# Patient Record
Sex: MALE | Race: WHITE | URBAN - METROPOLITAN AREA
[De-identification: names, ages, dates, MRNs, and addresses within clinical notes are randomized per-mention and may not be internally consistent; named-entity substitution may affect disease eponyms.]

---

## 2022-09-02 PROBLEM — Z30.2 ENCOUNTER FOR STERILIZATION: Status: ACTIVE | Noted: 2022-09-02

## 2024-05-21 LAB
BUN / CREAT RATIO: NORMAL
BUN BLDV-MCNC: 11 MG/DL
CREAT SERPL-MCNC: 0.95 MG/DL

## 2024-07-08 ENCOUNTER — OFFICE VISIT (OUTPATIENT)
Age: 34
End: 2024-07-08
Payer: COMMERCIAL

## 2024-07-08 VITALS — WEIGHT: 220 LBS | HEIGHT: 68 IN | BODY MASS INDEX: 33.34 KG/M2

## 2024-07-08 DIAGNOSIS — Z98.52 S/P VASECTOMY: ICD-10-CM

## 2024-07-08 DIAGNOSIS — R35.0 URINARY FREQUENCY: Primary | ICD-10-CM

## 2024-07-08 PROBLEM — Z30.2 ENCOUNTER FOR STERILIZATION: Status: RESOLVED | Noted: 2022-09-02 | Resolved: 2024-07-08

## 2024-07-08 LAB
BILIRUBIN, POC: NORMAL
BLOOD URINE, POC: NORMAL
CLARITY, POC: CLEAR
COLOR, POC: YELLOW
GLUCOSE URINE, POC: NORMAL
KETONES, POC: NORMAL
LEUKOCYTE EST, POC: NORMAL
NITRITE, POC: NORMAL
PH, POC: NORMAL
PROTEIN, POC: NORMAL
SPECIFIC GRAVITY, POC: NORMAL
UROBILINOGEN, POC: NORMAL

## 2024-07-08 PROCEDURE — G8417 CALC BMI ABV UP PARAM F/U: HCPCS | Performed by: SPECIALIST

## 2024-07-08 PROCEDURE — 1036F TOBACCO NON-USER: CPT | Performed by: SPECIALIST

## 2024-07-08 PROCEDURE — 81003 URINALYSIS AUTO W/O SCOPE: CPT | Performed by: SPECIALIST

## 2024-07-08 PROCEDURE — G8427 DOCREV CUR MEDS BY ELIG CLIN: HCPCS | Performed by: SPECIALIST

## 2024-07-08 PROCEDURE — 99213 OFFICE O/P EST LOW 20 MIN: CPT | Performed by: SPECIALIST

## 2024-07-08 NOTE — PROGRESS NOTES
Branden Townsend MD [facility] Urology Office Progress Note    Patient:  Jenaro Boyd  YOB: 1990  Date: 7/8/2024    HISTORY OF PRESENT ILLNESS:   The patient is a 34 y.o. male  The patient presents with increase urgency and frequency which started 2 months ago.  He went to an [facility] and his UA was negative but they treated him for possible chronic prostatitis.  His symptoms have essentially resolved and the exacerbating factors were probably a combination of factors: stress, constipation (has IBS), Mountain Dew.  Patient instructed to avoid bladder irritants in diet such as coffee, tea, caffeine, alcohol, carbonated beverages, spicy/acidic foods.  Patient given a list of these to avoid.   Patient never got a negative semen analysis after 9/2/22 bilateral vasectomy.  Patient will bring in a sample for my review in the near future.    Lower urinary tract symptoms: urgency, frequency, and decreased urinary stream   Last AUA Symptom Score (QOL): 1 (0)  Today's AUA Symptom Score (QOL): 6 (3)    Summary of old records:   9/2/22 Bilateral Vasectomy    Frequency, IBS, worse with constipation, Mountain Dew, stress    Additional History: none    Procedures Today: N/A    Urinalysis today:  Results for POC orders placed in visit on 07/08/24   POCT Urinalysis No Micro (Auto)   Result Value Ref Range    Color, UA yellow     Clarity, UA clear     Glucose, UA POC neg     Bilirubin, UA      Ketones, UA      Spec Grav, UA      Blood, UA POC neg     pH, UA      Protein, UA POC neg     Urobilinogen, UA      Leukocytes, UA neg     Nitrite, UA neg        Last several PSA's:  No results found for: \"PSA\"    Last total testosterone:  No results found for: \"TESTOSTERONE\"    Last BUN and creatinine:  Lab Results   Component Value Date    BUN 11 05/21/2024     Lab Results   Component Value Date    CREATININE 0.95 05/21/2024       Last CBC:  No results found for: \"WBC\", \"HGB\", \"HCT\", \"MCV\",